# Patient Record
Sex: FEMALE | Employment: UNEMPLOYED | URBAN - METROPOLITAN AREA
[De-identification: names, ages, dates, MRNs, and addresses within clinical notes are randomized per-mention and may not be internally consistent; named-entity substitution may affect disease eponyms.]

---
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
## 2024-03-04 ENCOUNTER — NURSE ONLY (OUTPATIENT)
Age: 19
End: 2024-03-04
Payer: COMMERCIAL

## 2024-03-04 DIAGNOSIS — N94.89 SUPPRESSION, MENSTRUATION: Primary | ICD-10-CM

## 2024-03-04 RX ORDER — MEDROXYPROGESTERONE ACETATE 150 MG/ML
150 INJECTION, SUSPENSION INTRAMUSCULAR
Status: SHIPPED | OUTPATIENT
Start: 2024-03-04

## 2024-03-05 ENCOUNTER — TELEPHONE (OUTPATIENT)
Age: 19
End: 2024-03-05

## 2024-03-05 PROCEDURE — 96372 THER/PROPH/DIAG INJ SC/IM: CPT | Performed by: OBSTETRICS & GYNECOLOGY

## 2024-03-05 RX ADMIN — MEDROXYPROGESTERONE ACETATE 150 MG: 150 INJECTION, SUSPENSION INTRAMUSCULAR at 11:11

## 2024-03-06 ENCOUNTER — NURSE ONLY (OUTPATIENT)
Age: 19
End: 2024-03-06
Payer: COMMERCIAL

## 2024-03-06 VITALS
HEIGHT: 59 IN | SYSTOLIC BLOOD PRESSURE: 96 MMHG | OXYGEN SATURATION: 98 % | DIASTOLIC BLOOD PRESSURE: 50 MMHG | BODY MASS INDEX: 27.21 KG/M2 | WEIGHT: 135 LBS | HEART RATE: 72 BPM

## 2024-03-06 DIAGNOSIS — N94.89 SUPPRESSION, MENSTRUATION: Primary | ICD-10-CM

## 2024-03-06 PROCEDURE — 96372 THER/PROPH/DIAG INJ SC/IM: CPT | Performed by: OBSTETRICS & GYNECOLOGY

## 2024-03-06 RX ORDER — MEDROXYPROGESTERONE ACETATE 150 MG/ML
150 INJECTION, SUSPENSION INTRAMUSCULAR
Status: SHIPPED | OUTPATIENT
Start: 2024-03-06

## 2024-03-06 RX ADMIN — MEDROXYPROGESTERONE ACETATE 150 MG: 150 INJECTION, SUSPENSION INTRAMUSCULAR at 11:05

## 2024-03-19 ENCOUNTER — OFFICE VISIT (OUTPATIENT)
Age: 19
End: 2024-03-19
Payer: COMMERCIAL

## 2024-03-19 VITALS
DIASTOLIC BLOOD PRESSURE: 56 MMHG | SYSTOLIC BLOOD PRESSURE: 106 MMHG | RESPIRATION RATE: 18 BRPM | BODY MASS INDEX: 27.42 KG/M2 | HEIGHT: 59 IN | OXYGEN SATURATION: 97 % | WEIGHT: 136 LBS | HEART RATE: 71 BPM

## 2024-03-19 DIAGNOSIS — L73.2 HIDRADENITIS SUPPURATIVA OF LEFT AXILLA: ICD-10-CM

## 2024-03-19 DIAGNOSIS — N89.8 VAGINAL DISCHARGE: Primary | ICD-10-CM

## 2024-03-19 PROCEDURE — 99213 OFFICE O/P EST LOW 20 MIN: CPT | Performed by: OBSTETRICS & GYNECOLOGY

## 2024-03-19 RX ORDER — CEPHALEXIN 500 MG/1
500 CAPSULE ORAL 2 TIMES DAILY
Qty: 14 CAPSULE | Refills: 0 | Status: SHIPPED | OUTPATIENT
Start: 2024-03-19 | End: 2024-03-26

## 2024-03-21 LAB
A VAGINAE DNA VAG QL NAA+PROBE: NORMAL SCORE
BVAB2 DNA VAG QL NAA+PROBE: NORMAL SCORE
C ALBICANS DNA VAG QL NAA+PROBE: NEGATIVE
C GLABRATA DNA VAG QL NAA+PROBE: NEGATIVE
C TRACH DNA VAG QL NAA+PROBE: NEGATIVE
MEGA1 DNA VAG QL NAA+PROBE: NORMAL SCORE
N GONORRHOEA DNA VAG QL NAA+PROBE: NEGATIVE
T VAGINALIS DNA VAG QL NAA+PROBE: NEGATIVE

## 2024-03-28 NOTE — PROGRESS NOTES
Lena Singh is a 18 y.o. female, , No LMP recorded. Patient has had an injection., who presents today for the following:  Chief Complaint   Patient presents with   • Vaginal Itching        No Known Allergies    Current Outpatient Medications   Medication Sig Dispense Refill   • terconazole (TERAZOL 7) 0.4 % vaginal cream Place vaginally nightly. 45 g 0   • medroxyPROGESTERone (DEPO-PROVERA) 150 MG/ML injection Inject 1 mL into the muscle every 3 months 1 mL 3     Current Facility-Administered Medications   Medication Dose Route Frequency Provider Last Rate Last Admin   • medroxyPROGESTERone (DEPO-PROVERA) injection 150 mg  150 mg IntraMUSCular Q3 Months Funmi Kidd MD   150 mg at 24 1105   • medroxyPROGESTERone (DEPO-PROVERA) injection 150 mg  150 mg IntraMUSCular Q3 Months Funmi Kidd MD   150 mg at 24 1111         History reviewed. No pertinent past medical history.    History reviewed. No pertinent surgical history.    Family History   Problem Relation Age of Onset   • No Known Problems Father    • No Known Problems Mother        Social History     Socioeconomic History   • Marital status: Single     Spouse name: Not on file   • Number of children: Not on file   • Years of education: Not on file   • Highest education level: Not on file   Occupational History   • Not on file   Tobacco Use   • Smoking status: Never   • Smokeless tobacco: Never   Vaping Use   • Vaping Use: Never used   Substance and Sexual Activity   • Alcohol use: Never   • Drug use: Yes     Types: Marijuana (Weed)   • Sexual activity: Yes     Partners: Male   Other Topics Concern   • Not on file   Social History Narrative   • Not on file     Social Determinants of Health     Financial Resource Strain: Low Risk  (2023)    Overall Financial Resource Strain (CARDIA)    • Difficulty of Paying Living Expenses: Not hard at all   Food Insecurity: No Food Insecurity (2023)    Hunger Vital

## 2024-04-09 ENCOUNTER — OFFICE VISIT (OUTPATIENT)
Age: 19
End: 2024-04-09
Payer: COMMERCIAL

## 2024-04-09 VITALS
WEIGHT: 136 LBS | DIASTOLIC BLOOD PRESSURE: 60 MMHG | HEIGHT: 59 IN | SYSTOLIC BLOOD PRESSURE: 131 MMHG | OXYGEN SATURATION: 96 % | BODY MASS INDEX: 27.42 KG/M2 | HEART RATE: 84 BPM | RESPIRATION RATE: 16 BRPM

## 2024-04-09 DIAGNOSIS — L73.9 FOLLICULITIS: Primary | ICD-10-CM

## 2024-04-09 PROCEDURE — 99213 OFFICE O/P EST LOW 20 MIN: CPT | Performed by: OBSTETRICS & GYNECOLOGY

## 2024-04-11 NOTE — PROGRESS NOTES
Food Insecurity: No Food Insecurity (12/14/2023)    Hunger Vital Sign    • Worried About Running Out of Food in the Last Year: Never true    • Ran Out of Food in the Last Year: Never true   Transportation Needs: Unknown (12/14/2023)    PRAPARE - Transportation    • Lack of Transportation (Medical): Not on file    • Lack of Transportation (Non-Medical): No   Physical Activity: Not on file   Stress: Not on file   Social Connections: Not on file   Intimate Partner Violence: Not on file   Housing Stability: Unknown (12/14/2023)    Housing Stability Vital Sign    • Unable to Pay for Housing in the Last Year: Not on file    • Number of Places Lived in the Last Year: Not on file    • Unstable Housing in the Last Year: No         HPI  Patient presents for evaluation  States she has noticed 'bumps\" on her labia following shaving  Not painful, no drainage ,no bleeding from the lesions,  Reports no previous similar symptoms  Has had past hx of STI    Review of Systems   All other systems reviewed and are negative.       /60 (Site: Left Upper Arm, Position: Sitting)   Pulse 84   Resp 16   Ht 1.499 m (4' 11\")   Wt 61.7 kg (136 lb)   SpO2 96%   BMI 27.47 kg/m²   OBGyn Exam   PE:  Constitutional: General Appearance: healthy-appearing, well-nourished, well-developed, and well groomed.     Psychiatric: Orientation: to time, place, and person. Mood and Affect: normal mood and affect and appropriate and active and alert.     Abdomen: Auscultation/Inspection/Palpation: no tenderness and mass and non-distended. Hernia: none palpated.     Female Genitalia: Vulva: no masses, atrophy, or lesions. Multiple ingrown hair    Bladder/Urethra: no urethral discharge or mass and normal meatus and bladder non distended.     Vagina no tenderness, erythema, cystocele, rectocele, abnormal vaginal discharge, or vesicle(s) or ulcers.     Cervix: no discharge or cervical motion tenderness and grossly normal.     Uterus: mobile, non-tender,

## 2024-05-22 ENCOUNTER — NURSE ONLY (OUTPATIENT)
Age: 19
End: 2024-05-22
Payer: COMMERCIAL

## 2024-05-22 VITALS
SYSTOLIC BLOOD PRESSURE: 111 MMHG | HEART RATE: 77 BPM | RESPIRATION RATE: 18 BRPM | BODY MASS INDEX: 27.32 KG/M2 | WEIGHT: 135.5 LBS | DIASTOLIC BLOOD PRESSURE: 62 MMHG | HEIGHT: 59 IN

## 2024-05-22 DIAGNOSIS — N94.89 SUPPRESSION, MENSTRUATION: Primary | ICD-10-CM

## 2024-05-22 PROCEDURE — 96372 THER/PROPH/DIAG INJ SC/IM: CPT | Performed by: OBSTETRICS & GYNECOLOGY

## 2024-05-22 RX ORDER — MEDROXYPROGESTERONE ACETATE 150 MG/ML
150 INJECTION, SUSPENSION INTRAMUSCULAR ONCE
Status: COMPLETED | OUTPATIENT
Start: 2024-05-22 | End: 2024-05-22

## 2024-05-22 RX ADMIN — MEDROXYPROGESTERONE ACETATE 150 MG: 150 INJECTION, SUSPENSION INTRAMUSCULAR at 11:18

## 2024-06-03 ENCOUNTER — TELEPHONE (OUTPATIENT)
Age: 19
End: 2024-06-03

## 2024-06-03 DIAGNOSIS — N89.8 VAGINAL DISCHARGE: ICD-10-CM

## 2024-12-10 NOTE — TELEPHONE ENCOUNTER
Pt I returning phone call, could someone call her back.
Spoke with patient and results given and prescription ordered per Dr. Iona Chirinos. Advised patient to have partner treated. We will test her again in 4 weeks. Depo sent for birth control and she will come get it on Monday along with rocephin. Per Dr. Iona Chirinos she just needs a urine pregnancy test she's just coming off her cycle.
- - -